# Patient Record
Sex: MALE | Race: WHITE | NOT HISPANIC OR LATINO | Employment: FULL TIME | URBAN - METROPOLITAN AREA
[De-identification: names, ages, dates, MRNs, and addresses within clinical notes are randomized per-mention and may not be internally consistent; named-entity substitution may affect disease eponyms.]

---

## 2017-10-18 ENCOUNTER — OFFICE VISIT (OUTPATIENT)
Dept: FAMILY MEDICINE | Age: 37
End: 2017-10-18

## 2017-10-18 ENCOUNTER — LAB SERVICES (OUTPATIENT)
Dept: LAB | Age: 37
End: 2017-10-18

## 2017-10-18 VITALS
HEART RATE: 56 BPM | OXYGEN SATURATION: 97 % | DIASTOLIC BLOOD PRESSURE: 54 MMHG | HEIGHT: 73 IN | WEIGHT: 171.8 LBS | SYSTOLIC BLOOD PRESSURE: 110 MMHG | BODY MASS INDEX: 22.77 KG/M2

## 2017-10-18 DIAGNOSIS — Z00.00 WELL ADULT EXAM: ICD-10-CM

## 2017-10-18 DIAGNOSIS — N91.2 AMENIA: ICD-10-CM

## 2017-10-18 DIAGNOSIS — Z00.00 WELL ADULT EXAM: Primary | ICD-10-CM

## 2017-10-18 DIAGNOSIS — M10.9 GOUT, UNSPECIFIED CAUSE, UNSPECIFIED CHRONICITY, UNSPECIFIED SITE: ICD-10-CM

## 2017-10-18 LAB
ALBUMIN SERPL-MCNC: 4.3 G/DL (ref 3.6–5.1)
ALBUMIN/GLOB SERPL: 1.4 {RATIO} (ref 1–2.4)
ALP SERPL-CCNC: 60 UNITS/L (ref 45–117)
ALT SERPL-CCNC: 24 UNITS/L
ANION GAP SERPL CALC-SCNC: 15 MMOL/L (ref 10–20)
AST SERPL-CCNC: 19 UNITS/L
BASOPHILS # BLD AUTO: 0.1 K/MCL (ref 0–0.3)
BASOPHILS NFR BLD AUTO: 1 %
BILIRUB SERPL-MCNC: 0.5 MG/DL (ref 0.2–1)
BUN SERPL-MCNC: 12 MG/DL (ref 6–20)
BUN/CREAT SERPL: 12 (ref 7–25)
CALCIUM SERPL-MCNC: 9.1 MG/DL (ref 8.4–10.2)
CHLORIDE SERPL-SCNC: 100 MMOL/L (ref 98–107)
CO2 SERPL-SCNC: 30 MMOL/L (ref 21–32)
CREAT SERPL-MCNC: 1.01 MG/DL (ref 0.67–1.17)
DIFFERENTIAL METHOD BLD: ABNORMAL
EOSINOPHIL # BLD AUTO: 0.5 K/MCL (ref 0.1–0.5)
EOSINOPHIL NFR SPEC: 9 %
ERYTHROCYTE [DISTWIDTH] IN BLOOD: 17.7 % (ref 11–15)
FASTING STATUS PATIENT QL REPORTED: 2 HRS
GLOBULIN SER-MCNC: 3 G/DL (ref 2–4)
GLUCOSE SERPL-MCNC: 90 MG/DL (ref 65–99)
HCT VFR BLD CALC: 37.5 % (ref 39–51)
HGB BLD-MCNC: 11.5 G/DL (ref 13–17)
LYMPHOCYTES # BLD MANUAL: 1.8 K/MCL (ref 1–4.8)
LYMPHOCYTES NFR BLD MANUAL: 29 %
MCH RBC QN AUTO: 23.1 PG (ref 26–34)
MCHC RBC AUTO-ENTMCNC: 30.7 G/DL (ref 32–36.5)
MCV RBC AUTO: 75.3 FL (ref 78–100)
MONOCYTES # BLD MANUAL: 0.7 K/MCL (ref 0.3–0.9)
MONOCYTES NFR BLD MANUAL: 11 %
NEUTROPHILS # BLD: 3.1 K/MCL (ref 1.8–7.7)
NEUTROPHILS NFR BLD AUTO: 50 %
PLATELET # BLD: 284 K/MCL (ref 140–450)
POTASSIUM SERPL-SCNC: 3.9 MMOL/L (ref 3.4–5.1)
PROT SERPL-MCNC: 7.3 G/DL (ref 6.4–8.2)
RBC # BLD: 4.98 MIL/MCL (ref 4.5–5.9)
SODIUM SERPL-SCNC: 141 MMOL/L (ref 135–145)
URATE SERPL-MCNC: 6.7 MG/DL (ref 3.5–7.2)
WBC # BLD: 6.2 K/MCL (ref 4.2–11)

## 2017-10-18 PROCEDURE — 99395 PREV VISIT EST AGE 18-39: CPT | Performed by: FAMILY MEDICINE

## 2017-10-18 PROCEDURE — 36415 COLL VENOUS BLD VENIPUNCTURE: CPT | Performed by: INTERNAL MEDICINE

## 2017-10-18 PROCEDURE — 84550 ASSAY OF BLOOD/URIC ACID: CPT | Performed by: INTERNAL MEDICINE

## 2017-10-18 PROCEDURE — 83540 ASSAY OF IRON: CPT | Performed by: INTERNAL MEDICINE

## 2017-10-18 PROCEDURE — 82607 VITAMIN B-12: CPT | Performed by: INTERNAL MEDICINE

## 2017-10-18 PROCEDURE — 83550 IRON BINDING TEST: CPT | Performed by: INTERNAL MEDICINE

## 2017-10-18 PROCEDURE — 85025 COMPLETE CBC W/AUTO DIFF WBC: CPT | Performed by: INTERNAL MEDICINE

## 2017-10-18 PROCEDURE — 80061 LIPID PANEL: CPT | Performed by: INTERNAL MEDICINE

## 2017-10-18 PROCEDURE — 82746 ASSAY OF FOLIC ACID SERUM: CPT | Performed by: INTERNAL MEDICINE

## 2017-10-18 PROCEDURE — 80053 COMPREHEN METABOLIC PANEL: CPT | Performed by: INTERNAL MEDICINE

## 2017-10-18 PROCEDURE — 82728 ASSAY OF FERRITIN: CPT | Performed by: INTERNAL MEDICINE

## 2017-10-18 PROCEDURE — 85046 RETICYTE/HGB CONCENTRATE: CPT | Performed by: INTERNAL MEDICINE

## 2017-10-19 ENCOUNTER — TELEPHONE (OUTPATIENT)
Dept: FAMILY MEDICINE | Age: 37
End: 2017-10-19

## 2017-10-19 LAB
CHOLEST SERPL-MCNC: 118 MG/DL
CHOLEST/HDLC SERPL: 2.8 {RATIO}
FERRITIN SERPL-MCNC: 8 NG/ML (ref 26–388)
FOLATE SERPL-MCNC: 16.3 NG/ML
HDLC SERPL-MCNC: 42 MG/DL
IMM RETICS NFR: 12.5 % (ref 1.5–16)
IRON SATN MFR SERPL: 7 % (ref 15–45)
IRON SERPL-MCNC: 32 MCG/DL (ref 65–175)
LDLC SERPL-MCNC: 42 MG/DL
LENGTH OF FAST TIME PATIENT: 2 HRS
NONHDLC SERPL-MCNC: 76 MG/DL
RETICS #: 42 K/MCL (ref 10–120)
RETICS/RBC NFR AUTO: 0.9 % (ref 0.3–2.5)
TIBC SERPL-MCNC: 434 MCG/DL (ref 250–450)
TRIGL SERPL-MCNC: 168 MG/DL
VIT B12 SERPL-MCNC: 391 PG/ML (ref 211–911)

## 2017-10-30 ENCOUNTER — TELEPHONE (OUTPATIENT)
Dept: FAMILY MEDICINE | Age: 37
End: 2017-10-30

## 2017-10-30 DIAGNOSIS — D50.9 IRON DEFICIENCY ANEMIA, UNSPECIFIED IRON DEFICIENCY ANEMIA TYPE: Primary | ICD-10-CM

## 2017-11-13 ENCOUNTER — OFFICE VISIT (OUTPATIENT)
Dept: GASTROENTEROLOGY | Age: 37
End: 2017-11-13
Attending: FAMILY MEDICINE

## 2017-11-13 VITALS — BODY MASS INDEX: 22.66 KG/M2 | HEIGHT: 73 IN | WEIGHT: 171 LBS

## 2017-11-13 DIAGNOSIS — D50.8 OTHER IRON DEFICIENCY ANEMIA: Primary | ICD-10-CM

## 2017-11-13 PROCEDURE — 99243 OFF/OP CNSLTJ NEW/EST LOW 30: CPT | Performed by: INTERNAL MEDICINE

## 2017-11-13 RX ORDER — LANOLIN ALCOHOL/MO/W.PET/CERES
325 CREAM (GRAM) TOPICAL 2 TIMES DAILY WITH MEALS
Qty: 60 TABLET | Refills: 3 | Status: SHIPPED | OUTPATIENT
Start: 2017-11-13 | End: 2018-06-22 | Stop reason: SDUPTHER

## 2018-03-12 ENCOUNTER — OFFICE VISIT (OUTPATIENT)
Dept: OPHTHALMOLOGY | Age: 38
End: 2018-03-12

## 2018-03-12 DIAGNOSIS — H04.123 DRY EYES, BILATERAL: ICD-10-CM

## 2018-03-12 DIAGNOSIS — H52.13 BILATERAL MYOPIA: Primary | ICD-10-CM

## 2018-03-12 DIAGNOSIS — H02.411 MECHANICAL PTOSIS OF RIGHT EYELID: ICD-10-CM

## 2018-03-12 DIAGNOSIS — H43.393 VITREOUS FLOATERS, BILATERAL: ICD-10-CM

## 2018-03-12 PROCEDURE — 92014 COMPRE OPH EXAM EST PT 1/>: CPT | Performed by: OPTOMETRIST

## 2018-03-12 PROCEDURE — 92015 DETERMINE REFRACTIVE STATE: CPT | Performed by: OPTOMETRIST

## 2018-03-12 ASSESSMENT — REFRACTION_MANIFEST
OS_SPHERE: -2.00
OS_CYLINDER: SPHERE
OD_SPHERE: -2.00
OD_CYLINDER: SPHERE

## 2018-03-12 ASSESSMENT — SLIT LAMP EXAM - LIDS
COMMENTS: NORMAL
COMMENTS: NORMAL

## 2018-03-12 ASSESSMENT — EXTERNAL EXAM - RIGHT EYE: OD_EXAM: NORMAL

## 2018-03-12 ASSESSMENT — CONF VISUAL FIELD
OS_NORMAL: 1
OD_NORMAL: 1
METHOD: COUNTING FINGERS

## 2018-03-12 ASSESSMENT — VISUAL ACUITY
CORRECTION_TYPE: CONTACTS
OD_CC: 20/20
OS_CC: 20/20
METHOD: SNELLEN - LINEAR
OD_CC: 20/20
OS_CC: 20/20

## 2018-03-12 ASSESSMENT — TONOMETRY
IOP_METHOD: APPLANATION
OD_IOP_MMHG: 16
OS_IOP_MMHG: 16

## 2018-03-12 ASSESSMENT — EXTERNAL EXAM - LEFT EYE: OS_EXAM: NORMAL

## 2018-03-12 ASSESSMENT — REFRACTION_CURRENTRX
OD_SPHERE: -2.00
OD_BRAND: EXTREME H2O 54%
OS_BASECURVE: MED
OS_SPHERE: -2.00
OS_DIAMETER: 14.2
OD_BASECURVE: MED
OD_DIAMETER: 14.2
OS_BRAND: EXTREME H2O 54%

## 2018-03-12 ASSESSMENT — CUP TO DISC RATIO
OS_RATIO: 0.6
OD_RATIO: 0.5

## 2018-03-12 ASSESSMENT — REFRACTION_WEARINGRX
OS_SPHERE: -2.00
OD_SPHERE: -2.00

## 2018-05-09 ENCOUNTER — TELEPHONE (OUTPATIENT)
Dept: FAMILY MEDICINE | Age: 38
End: 2018-05-09

## 2018-05-09 DIAGNOSIS — M10.9 GOUT, UNSPECIFIED CAUSE, UNSPECIFIED CHRONICITY, UNSPECIFIED SITE: Primary | ICD-10-CM

## 2018-05-09 RX ORDER — INDOMETHACIN 50 MG/1
CAPSULE ORAL
Qty: 30 CAPSULE | Refills: 1 | Status: SHIPPED | OUTPATIENT
Start: 2018-05-09

## 2018-06-20 ENCOUNTER — OFFICE VISIT (OUTPATIENT)
Dept: FAMILY MEDICINE | Age: 38
End: 2018-06-20

## 2018-06-20 ENCOUNTER — LAB SERVICES (OUTPATIENT)
Dept: LAB | Age: 38
End: 2018-06-20

## 2018-06-20 ENCOUNTER — TELEPHONE (OUTPATIENT)
Dept: FAMILY MEDICINE | Age: 38
End: 2018-06-20

## 2018-06-20 VITALS
HEART RATE: 55 BPM | BODY MASS INDEX: 22.48 KG/M2 | SYSTOLIC BLOOD PRESSURE: 112 MMHG | WEIGHT: 170.4 LBS | DIASTOLIC BLOOD PRESSURE: 60 MMHG

## 2018-06-20 DIAGNOSIS — D50.9 IRON DEFICIENCY ANEMIA, UNSPECIFIED IRON DEFICIENCY ANEMIA TYPE: ICD-10-CM

## 2018-06-20 DIAGNOSIS — R56.9 SEIZURE (CMD): Primary | ICD-10-CM

## 2018-06-20 LAB
BASOPHILS # BLD AUTO: 0.1 K/MCL (ref 0–0.3)
BASOPHILS NFR BLD AUTO: 1 %
DIFFERENTIAL METHOD BLD: ABNORMAL
EOSINOPHIL # BLD AUTO: 0.4 K/MCL (ref 0.1–0.5)
EOSINOPHIL NFR SPEC: 7 %
ERYTHROCYTE [DISTWIDTH] IN BLOOD: 15.3 % (ref 11–15)
HCT VFR BLD CALC: 36.9 % (ref 39–51)
HGB BLD-MCNC: 11.7 G/DL (ref 13–17)
LYMPHOCYTES # BLD MANUAL: 1.7 K/MCL (ref 1–4.8)
LYMPHOCYTES NFR BLD MANUAL: 32 %
MCH RBC QN AUTO: 25.3 PG (ref 26–34)
MCHC RBC AUTO-ENTMCNC: 31.7 G/DL (ref 32–36.5)
MCV RBC AUTO: 79.9 FL (ref 78–100)
MONOCYTES # BLD MANUAL: 0.6 K/MCL (ref 0.3–0.9)
MONOCYTES NFR BLD MANUAL: 10 %
NEUTROPHILS # BLD: 2.7 K/MCL (ref 1.8–7.7)
NEUTROPHILS NFR BLD AUTO: 50 %
PLATELET # BLD: 254 K/MCL (ref 140–450)
RBC # BLD: 4.62 MIL/MCL (ref 4.5–5.9)
WBC # BLD: 5.4 K/MCL (ref 4.2–11)

## 2018-06-20 PROCEDURE — 85025 COMPLETE CBC W/AUTO DIFF WBC: CPT | Performed by: INTERNAL MEDICINE

## 2018-06-20 PROCEDURE — 83540 ASSAY OF IRON: CPT | Performed by: INTERNAL MEDICINE

## 2018-06-20 PROCEDURE — 36415 COLL VENOUS BLD VENIPUNCTURE: CPT | Performed by: INTERNAL MEDICINE

## 2018-06-20 PROCEDURE — 99213 OFFICE O/P EST LOW 20 MIN: CPT | Performed by: FAMILY MEDICINE

## 2018-06-20 PROCEDURE — 83550 IRON BINDING TEST: CPT | Performed by: INTERNAL MEDICINE

## 2018-06-20 PROCEDURE — 82728 ASSAY OF FERRITIN: CPT | Performed by: INTERNAL MEDICINE

## 2018-06-20 ASSESSMENT — PATIENT HEALTH QUESTIONNAIRE - PHQ9
CLINICAL INTERPRETATION OF PHQ2 SCORE: 0
SUM OF ALL RESPONSES TO PHQ9 QUESTIONS 1 AND 2: 0

## 2018-06-21 LAB
FERRITIN SERPL-MCNC: 9 NG/ML (ref 26–388)
IRON SATN MFR SERPL: 8 % (ref 15–45)
IRON SERPL-MCNC: 31 MCG/DL (ref 65–175)
TIBC SERPL-MCNC: 407 MCG/DL (ref 250–450)

## 2018-06-22 ENCOUNTER — TELEPHONE (OUTPATIENT)
Dept: FAMILY MEDICINE | Age: 38
End: 2018-06-22

## 2018-06-22 ENCOUNTER — TELEPHONE (OUTPATIENT)
Dept: NEUROLOGY | Facility: CLINIC | Age: 38
End: 2018-06-22

## 2018-06-22 DIAGNOSIS — D50.9 IRON DEFICIENCY ANEMIA, UNSPECIFIED IRON DEFICIENCY ANEMIA TYPE: Primary | ICD-10-CM

## 2018-06-22 RX ORDER — LANOLIN ALCOHOL/MO/W.PET/CERES
325 CREAM (GRAM) TOPICAL DAILY
Qty: 90 TABLET | Refills: 0 | Status: SHIPPED | OUTPATIENT
Start: 2018-06-22

## 2018-06-22 NOTE — TELEPHONE ENCOUNTER
KIKO, holding appt for Friday, 6/29 at [facility] ok per Dr. Jacinta Garrett

## 2018-07-03 ENCOUNTER — OFF PREMISE (OUTPATIENT)
Dept: OTHER | Age: 38
End: 2018-07-03